# Patient Record
Sex: MALE | Race: WHITE | NOT HISPANIC OR LATINO
[De-identification: names, ages, dates, MRNs, and addresses within clinical notes are randomized per-mention and may not be internally consistent; named-entity substitution may affect disease eponyms.]

---

## 2017-02-01 ENCOUNTER — OTHER (OUTPATIENT)
Age: 52
End: 2017-02-01

## 2017-04-13 ENCOUNTER — APPOINTMENT (OUTPATIENT)
Dept: PULMONOLOGY | Facility: CLINIC | Age: 52
End: 2017-04-13

## 2017-04-13 VITALS
HEIGHT: 69 IN | OXYGEN SATURATION: 98 % | HEART RATE: 69 BPM | DIASTOLIC BLOOD PRESSURE: 80 MMHG | RESPIRATION RATE: 16 BRPM | BODY MASS INDEX: 46.06 KG/M2 | WEIGHT: 311 LBS | SYSTOLIC BLOOD PRESSURE: 130 MMHG | TEMPERATURE: 97.5 F

## 2017-06-21 DIAGNOSIS — I10 ESSENTIAL (PRIMARY) HYPERTENSION: ICD-10-CM

## 2017-06-21 DIAGNOSIS — E78.5 HYPERLIPIDEMIA, UNSPECIFIED: ICD-10-CM

## 2017-07-31 ENCOUNTER — APPOINTMENT (OUTPATIENT)
Dept: SURGERY | Facility: CLINIC | Age: 52
End: 2017-07-31
Payer: COMMERCIAL

## 2017-07-31 VITALS
RESPIRATION RATE: 16 BRPM | OXYGEN SATURATION: 98 % | DIASTOLIC BLOOD PRESSURE: 90 MMHG | HEART RATE: 65 BPM | TEMPERATURE: 98.6 F | HEIGHT: 68 IN | BODY MASS INDEX: 45.54 KG/M2 | WEIGHT: 300.5 LBS | SYSTOLIC BLOOD PRESSURE: 139 MMHG

## 2017-07-31 DIAGNOSIS — K76.0 FATTY (CHANGE OF) LIVER, NOT ELSEWHERE CLASSIFIED: ICD-10-CM

## 2017-07-31 DIAGNOSIS — Z78.9 OTHER SPECIFIED HEALTH STATUS: ICD-10-CM

## 2017-07-31 PROCEDURE — 99245 OFF/OP CONSLTJ NEW/EST HI 55: CPT

## 2017-08-09 ENCOUNTER — APPOINTMENT (OUTPATIENT)
Dept: PULMONOLOGY | Facility: CLINIC | Age: 52
End: 2017-08-09
Payer: COMMERCIAL

## 2017-08-09 VITALS
BODY MASS INDEX: 45.77 KG/M2 | OXYGEN SATURATION: 96 % | TEMPERATURE: 98.5 F | WEIGHT: 302 LBS | HEART RATE: 82 BPM | DIASTOLIC BLOOD PRESSURE: 90 MMHG | SYSTOLIC BLOOD PRESSURE: 130 MMHG | RESPIRATION RATE: 18 BRPM | HEIGHT: 68 IN

## 2017-08-09 PROCEDURE — 94729 DIFFUSING CAPACITY: CPT | Mod: GC

## 2017-08-09 PROCEDURE — 94060 EVALUATION OF WHEEZING: CPT | Mod: GC

## 2017-08-09 PROCEDURE — 94726 PLETHYSMOGRAPHY LUNG VOLUMES: CPT | Mod: GC

## 2017-08-09 PROCEDURE — ZZZZZ: CPT

## 2017-08-09 PROCEDURE — 99215 OFFICE O/P EST HI 40 MIN: CPT | Mod: 25,GC

## 2018-02-06 ENCOUNTER — APPOINTMENT (OUTPATIENT)
Dept: PULMONOLOGY | Facility: CLINIC | Age: 53
End: 2018-02-06
Payer: COMMERCIAL

## 2018-02-06 VITALS
WEIGHT: 300 LBS | SYSTOLIC BLOOD PRESSURE: 130 MMHG | BODY MASS INDEX: 45.47 KG/M2 | RESPIRATION RATE: 18 BRPM | DIASTOLIC BLOOD PRESSURE: 90 MMHG | HEIGHT: 68 IN | HEART RATE: 70 BPM | OXYGEN SATURATION: 99 %

## 2018-02-06 DIAGNOSIS — Z86.79 PERSONAL HISTORY OF OTHER DISEASES OF THE CIRCULATORY SYSTEM: ICD-10-CM

## 2018-02-06 DIAGNOSIS — Z86.39 PERSONAL HISTORY OF OTHER ENDOCRINE, NUTRITIONAL AND METABOLIC DISEASE: ICD-10-CM

## 2018-02-06 DIAGNOSIS — E66.9 OBESITY, UNSPECIFIED: ICD-10-CM

## 2018-02-06 PROCEDURE — 99205 OFFICE O/P NEW HI 60 MIN: CPT | Mod: 25

## 2018-02-06 PROCEDURE — 94060 EVALUATION OF WHEEZING: CPT

## 2018-02-06 PROCEDURE — 94727 GAS DIL/WSHOT DETER LNG VOL: CPT

## 2018-02-06 PROCEDURE — 71046 X-RAY EXAM CHEST 2 VIEWS: CPT

## 2018-02-06 PROCEDURE — 94729 DIFFUSING CAPACITY: CPT

## 2018-08-16 ENCOUNTER — APPOINTMENT (OUTPATIENT)
Dept: PULMONOLOGY | Facility: CLINIC | Age: 53
End: 2018-08-16

## 2018-08-17 ENCOUNTER — APPOINTMENT (OUTPATIENT)
Dept: PULMONOLOGY | Facility: CLINIC | Age: 53
End: 2018-08-17
Payer: COMMERCIAL

## 2018-08-17 VITALS
RESPIRATION RATE: 15 BRPM | OXYGEN SATURATION: 98 % | DIASTOLIC BLOOD PRESSURE: 70 MMHG | HEIGHT: 68 IN | TEMPERATURE: 97.8 F | WEIGHT: 290 LBS | HEART RATE: 69 BPM | BODY MASS INDEX: 43.95 KG/M2 | SYSTOLIC BLOOD PRESSURE: 132 MMHG

## 2018-08-17 PROCEDURE — 99214 OFFICE O/P EST MOD 30 MIN: CPT

## 2020-07-30 ENCOUNTER — APPOINTMENT (OUTPATIENT)
Dept: PULMONOLOGY | Facility: CLINIC | Age: 55
End: 2020-07-30
Payer: COMMERCIAL

## 2020-07-30 PROCEDURE — 99213 OFFICE O/P EST LOW 20 MIN: CPT | Mod: 95

## 2020-07-30 RX ORDER — LISINOPRIL AND HYDROCHLOROTHIAZIDE TABLETS 10; 12.5 MG/1; MG/1
10-12.5 TABLET ORAL
Refills: 0 | Status: ACTIVE | COMMUNITY

## 2020-07-30 RX ORDER — METFORMIN HYDROCHLORIDE 500 MG/1
500 TABLET, EXTENDED RELEASE ORAL
Qty: 180 | Refills: 0 | Status: DISCONTINUED | COMMUNITY
Start: 2018-06-27 | End: 2020-07-30

## 2020-07-30 NOTE — PHYSICAL EXAM
[Normal Appearance] : normal appearance [Well Groomed] : well groomed [General Appearance - In No Acute Distress] : no acute distress [Affect] : the affect was normal [Mood] : the mood was normal

## 2020-07-30 NOTE — HISTORY OF PRESENT ILLNESS
[Other Location: e.g. Home (Enter Location, City,State)___] : at [unfilled] [Home] : at home, [unfilled] , at the time of the visit. [Verbal consent obtained from patient] : the patient, [unfilled] [Unintentional Sleep While Inactive] : unintentional sleep while inactive [AHI: ___ per hour] : Apnea-hypopnea index:  [unfilled] per hour [T90%: ___] : T90%: [unfilled]% [Carl desatuation%: ___] : Carl desaturation:  [unfilled]% [Date: ___] : the most recent therapeutic polysomnogram was completed [unfilled] [Obstructive Sleep Apnea] : obstructive sleep apnea [ESS: ___] : ESS score [unfilled] [Recent  Weight Gain] : recent weight gain [To Bed: ___] : ~he/she~ goes to bed at [unfilled] [Arises: ___] : arises at [unfilled] [Sleep Onset Latency: ___ minutes] : sleep onset latency of [unfilled] minutes reported [Nocturnal Awakenings: ___] : ~he/she~ typically has [unfilled] nocturnal awakenings [WASO: ___] : Wake time after sleep onset is [unfilled] [TST: ___] : Total sleep time is [unfilled] [% Days used: ____] : Days used: [unfilled] % [FreeTextEntry1] : 55 year old TYRONE GORDON  presents for follow-up to 2018 visit for continued management of severe obstructive sleep apnea on CPAP therapy since 1999.\par \par COMORBID:  hypertension, hyperlipidemia, diabetes, morbid obesity, fatty liver.\par \par INTERIM CHANGES:  10-lb weight gain.  Current weight ~300 lbs.  Medication list was updated.\par \par SERENITY  \par The patient was diagnosed with SERENITY in 1999.  Repeat diagnostic PSG in 2013 reported severe SERENITY.  CPAP therapy has been well tolerated since 1999, currently at pressure of 12 cmH2O using a nasal mask without complaints.  With nightly CPAP use for 7-8 hours, patient notes no sore throat, no snoring, improvement in sleep.                                     .   \par \par SLEEP SCHEDULE:  Typically 7-8 hours between 11PM-6AM, with unintentional daytime sleep only while inactive. \par \par EPWORTH SLEEPINESS SCALE\par \par How likely are you to doze off or fall asleep in the situations described below, in contrast to feeling just tired?  This refers to your usual way of life in recent times.  Even if you haven't done some of these things recently, try to work out how they would have affected you.  Use the following scale to choose one most appropriate number for each situation.\par \par 0 = never would doze\par 1 = slight chance of dozing\par 2 = moderate chance of dozing\par 3 = high chance of dozing\par \par Chance of dozing............Situation\par 2........................................Sitting and reading\par 1........................................Watching TV\par 1........................................Sitting inactive in a public place (eg a theatre or a meeting)\par 1........................................As a passenger in a car for an hour without a break\par 1........................................Lying down to rest in the afternoon when circumstances permit\par 0........................................Sitting and talking to someone\par 0........................................Sitting quietly after lunch without alcohol\par 2........................................In a car, while stopped for a few minutes in traffic\par \par 8........................................TOTAL ESS SCORE\par _________________________________________________________________________________\par  [Snoring] : no snoring [Witnessed Apneas] : no witnessed sleep apnea [Frequent Nocturnal Awakening] : no nocturnal awakening [Unintentional Sleep while Active] : no unintentional sleep while active [Daytime Somnolence] : no daytime somnolence [Awakes Unrefreshed] : does not awake unrefreshed [Awakes with Headache] : no headache upon awakening [Awakening With Dry Mouth] : no dry mouth upon awakening [Daytime Sleep: ___] : daytime sleep: [unfilled] [CPAP: ___ cmH2O] : CPAP: [unfilled] cmH2O [Nocturnal Oxygen] : The patient does not use nocturnal oxygen

## 2020-07-30 NOTE — ASSESSMENT
[FreeTextEntry1] : 55 year old TYRONE GORDON with HTN, HLD, DM, morbid obesity, fatty liver, severe obstructive sleep apnea on CPAP therapy since 1999; presents for 2-year follow-up visit.\par \par The patient is benefitting from nightly CPAP therapy for 7-8 hours, with resolution of SERENITY-related symptoms and improvement in sleep quality.  An order was placed for [employer] to continue to renew PAP supplies for continuation of therapy at the current pressure setting, and to link patient's 2018 PAP device.  \par \par Patient gained 10-lbs since last 2013 titration study.  He was advised to avoid weight gain.\par \par He will follow up in one year or sooner if needed.

## 2020-07-30 NOTE — REVIEW OF SYSTEMS
[Recent Wt Gain (___ Lbs)] : recent [unfilled] ~Ulb weight gain [Palpitations] : palpitations [Diabetes] : diabetes  [Obesity] : obesity [Heartburn] : heartburn [Fatigue] : no fatigue [Nasal Congestion] : no nasal congestion [Postnasal Drip] : no postnasal drip [Shortness Of Breath] : no shortness of breath [Chest Pain] : no chest pain [Thyroid Disease] : no thyroid disease [Anemia] : no anemia [History of Iron Deficiency] : no history of iron deficiency [A.M. Headache] : no headache present upon awakening [Nocturia] : no nocturia [Arthralgias] : no arthralgias [Depression] : no depression [Anxious] : not anxious [Difficulty Initiating Sleep] : no difficulty falling asleep [Difficulty Maintaining Sleep] : no difficulty maintaining sleep [Lower Extremity Discomfort] : no lower extremity discomfort [Unusual Sleep Behavior] : no unusual sleep behavior [de-identified] : off metformin [FreeTextEntry1] : 11 PM [FreeTextEntry2] : 6 AM [FreeTextEntry3] : 5-10 minutes [FreeTextEntry4] : 1 [FreeTextEntry5] : brief [FreeTextEntry6] : 7-8 hours [FreeTextEntry7] : only when inactive [FreeTextEntry8] : No

## 2022-09-27 ENCOUNTER — NON-APPOINTMENT (OUTPATIENT)
Age: 57
End: 2022-09-27

## 2022-09-27 ENCOUNTER — APPOINTMENT (OUTPATIENT)
Dept: PULMONOLOGY | Facility: CLINIC | Age: 57
End: 2022-09-27

## 2022-09-27 VITALS
HEIGHT: 68 IN | HEART RATE: 58 BPM | BODY MASS INDEX: 42.44 KG/M2 | DIASTOLIC BLOOD PRESSURE: 64 MMHG | OXYGEN SATURATION: 98 % | TEMPERATURE: 97.5 F | RESPIRATION RATE: 16 BRPM | WEIGHT: 280 LBS | SYSTOLIC BLOOD PRESSURE: 112 MMHG

## 2022-09-27 DIAGNOSIS — R05.8 OTHER SPECIFIED COUGH: ICD-10-CM

## 2022-09-27 DIAGNOSIS — Z87.19 PERSONAL HISTORY OF OTHER DISEASES OF THE DIGESTIVE SYSTEM: ICD-10-CM

## 2022-09-27 DIAGNOSIS — Z87.891 PERSONAL HISTORY OF NICOTINE DEPENDENCE: ICD-10-CM

## 2022-09-27 DIAGNOSIS — Z83.3 FAMILY HISTORY OF DIABETES MELLITUS: ICD-10-CM

## 2022-09-27 DIAGNOSIS — K57.90 DIVERTICULOSIS OF INTESTINE, PART UNSPECIFIED, W/OUT PERFORATION OR ABSCESS W/OUT BLEEDING: ICD-10-CM

## 2022-09-27 DIAGNOSIS — Z82.49 FAMILY HISTORY OF ISCHEMIC HEART DISEASE AND OTHER DISEASES OF THE CIRCULATORY SYSTEM: ICD-10-CM

## 2022-09-27 PROCEDURE — 95012 NITRIC OXIDE EXP GAS DETER: CPT

## 2022-09-27 PROCEDURE — 94618 PULMONARY STRESS TESTING: CPT

## 2022-09-27 PROCEDURE — 99204 OFFICE O/P NEW MOD 45 MIN: CPT | Mod: 25

## 2022-09-27 PROCEDURE — 94010 BREATHING CAPACITY TEST: CPT

## 2022-09-27 PROCEDURE — 71046 X-RAY EXAM CHEST 2 VIEWS: CPT

## 2022-09-27 RX ORDER — BUDESONIDE AND FORMOTEROL FUMARATE DIHYDRATE 160; 4.5 UG/1; UG/1
160-4.5 AEROSOL RESPIRATORY (INHALATION) TWICE DAILY
Qty: 1 | Refills: 1 | Status: ACTIVE | COMMUNITY
Start: 2022-09-27 | End: 1900-01-01

## 2022-09-27 NOTE — HISTORY OF PRESENT ILLNESS
[TextBox_4] : Mr. GORDON is a 57 year old male with a history of prior 12 pack year smoker, ow, HTN, HLD, fatty liver, SERENITY, s/p diverticulosis presenting to the office today for initial pulmonary evaluation. His chief complaint is\par \par -he notes snoring when he doesn't use the CPAP\par -he notes mostly compliant with CPAP\par -he notes neck size is 20\par -he notes ability to fall asleep while watching TV and while in a car \par -he notes his memory and concentration are good \par -he notes dyspnea while in supine position\par -he notes wheezing only while congested\par -he denies PND\par -he notes seasonal allergies in spring\par -he notes losing weight \par -he notes no bowel movement in 3 days\par -he notes energy levels decreases with weight gain\par \par -he denies any headaches, nausea, vomiting, fever, chills, sweats, chest pain, chest pressure, coughing, palpitations, constipation, diarrhea, dizziness, dysphagia, heartburn, reflux, itchy eyes, itchy ears, leg swelling, leg pain, arthralgias, myalgias, or sour taste in the mouth.

## 2022-09-27 NOTE — ADDENDUM
[FreeTextEntry1] : Documented by NANCI Green acting as a scribe for Dr. Yash Hamm on 09/27/2022 .\par All medical record entries made by the Scribe were at my, Dr. Yash Hamm's, direction and personally dictated by me on 09/27/2022. I have reviewed the chart and agree that the record accurately reflects my personal performance of the history, physical exam, assessment and plan. I have also personally directed, reviewed, and agree with the discharge instructions.

## 2022-09-27 NOTE — ASSESSMENT
[FreeTextEntry1] : Mr. GORDON is a 57 year old male with a history of prior 12 pack year smoker, ow, HTN, HLD, fatty liver, SERENITY,  s/p diverticulosis presenting to the office today for initial pulmonary evaluation for SOB, seasonal allergies, GERD, SERENITY\par \par His shortness of breath is multifactorial due to:\par -poor mechanics of breathing \par -out of shape \par -over weight \par -pulmonary disease\par   -\par -cardiac disease \par \par problem 1: ?inflammatory cough\par -add Symbicort 160 2 inhalations BID PRN\par \par problem 2: allergy/sinus\par -complete blood work to include: asthma panel, food IgE panel, IgE level, eosinophil level, vitamin D level \par \par -Environmental measures for allergies were encouraged including mattress and pillow covers, air purifier, and environmental controls. \par \par Problem 3: GERD\par -add Pepcid 40 mg QHS \par \par -Rule of 2s: avoid eating too much, eating too late, eating too spicy, eating two hours before bed.\par -Things to avoid including overeating, spicy foods, tight clothing, eating within three hours of bed, this list is not all inclusive.\par -For treatment of reflux, possible options discussed including diet control, H2 blockers, PPIs, as well as coating motility agents discussed as treatment options. Timing of meals and proximity of last meal to sleep were discussed. If symptoms persist, a formal gastrointestinal evaluation is needed. \par \par Problem 4: SERENITY (risk factors: elevated Mallampati class, snoring, neck size 20)\par -continue using CPAP- tolerating well- Dixon nasal CPAP (11)\par -complete blood work to include: free and total testosterone\par -Sleep apnea is associated with adverse clinical consequences which can affect most organ systems.\par Cardiovascular disease risk includes arrhythmias, atrial fibrillation, hypertension, coronary artery disease, and stroke. Metabolic disorders include diabetes type 2, non-alcoholic fatty liver disease. Mood disorder especially depression; and cognitive decline especially in the elderly. Associations with chronic reflux/Vazquez’s esophagus some but not all inclusive.\par -Reasons include arousal consistent with hypopnea; respiratory events most prominent in REM sleep or supine position; therefore sleep staging and body position are important for accurate diagnosis and estimation of AHI. \par \par problem 5: cardiac disease\par -recommended to continue to follow up with Cardiologist (Sudheer)\par \par problem 6: poor breathing mechanics\par -Recommended Wim Hof and Butana liliako breathing technique \par -Proper breathing techniques were reviewed with an emphasis of exhalation. Patient instructed to breath in for 1 second and out for four seconds. Patient was encouraged to not talk while walking. \par \par problem 7:  overweight/ out of shape\par -recommended "10-Day Detox Diet" by Dr. Zach Gomez \par -Weight loss, exercise, and diet control were discussed and are highly encouraged. Treatment options are given such as, aqua therapy, and contacting a nutritionist. Recommended to use the elliptical, stationary bike, less use of treadmill. \par \par problem 8: health maintenance \par -recommended yearly flu shot after October 15\par -recommended strep pneumonia vaccines: Prevnar-20 vaccine, followed by Pneumo vaccine 23 one year following after 65 years old. \par -recommended early intervention for Upper Respiratory Infections (URIs)\par -recommended regular osteoporosis evaluations\par -recommended early dermatological evaluations\par -recommended after the age of 50 to the age of 70, colonoscopy every 5 years\par \par F/U in 6-8 weeks.\par He is encouraged to call with any changes, concerns, or questions

## 2022-09-27 NOTE — PROCEDURE
[FreeTextEntry1] : CXR reveals a normal sized heart; no evidence of infiltrate or effusion--a normal appearing chest radiograph \par \par PFT reveals normal flows, with an FEV1 of  3.19L, which is 91% of predicted, with a normal flow volume loop. \par \par 6 minute walk test reveals a low saturation of 97% with no evidence of dyspnea or fatigue; walked 586.8 meters\par \par FENO was 21; a normal value being less than 25\par Fractional exhaled nitric oxide (FENO) is regarded as a simple, noninvasive method for assessing eosinophilic airway inflammation. Produced by a variety of cells within the lung, nitric oxide (NO) concentrations are generally low in healthy individuals. However, high concentrations of NO appear to be involved in nonspecific host defense mechanisms and chronic inflammatory diseases such as asthma. The American Thoracic Society (ATS) therefore has recommended using FENO to aid in the diagnosis and monitoring of eosinophilic airway inflammation and asthma, and for identifying steroid responsive individuals whose chronic respiratory symptoms may be caused by airway inflammation.

## 2022-10-27 ENCOUNTER — LABORATORY RESULT (OUTPATIENT)
Age: 57
End: 2022-10-27

## 2022-10-28 LAB
24R-OH-CALCIDIOL SERPL-MCNC: 34.2 PG/ML
25(OH)D3 SERPL-MCNC: 29.1 NG/ML
BASOPHILS # BLD AUTO: 0.04 K/UL
BASOPHILS NFR BLD AUTO: 0.5 %
EOSINOPHIL # BLD AUTO: 0.21 K/UL
EOSINOPHIL NFR BLD AUTO: 2.6 %
HCT VFR BLD CALC: 45.8 %
HGB BLD-MCNC: 15.3 G/DL
IMM GRANULOCYTES NFR BLD AUTO: 0.6 %
LYMPHOCYTES # BLD AUTO: 2.5 K/UL
LYMPHOCYTES NFR BLD AUTO: 30.7 %
MAN DIFF?: NORMAL
MCHC RBC-ENTMCNC: 28.8 PG
MCHC RBC-ENTMCNC: 33.4 GM/DL
MCV RBC AUTO: 86.1 FL
MONOCYTES # BLD AUTO: 0.47 K/UL
MONOCYTES NFR BLD AUTO: 5.8 %
NEUTROPHILS # BLD AUTO: 4.87 K/UL
NEUTROPHILS NFR BLD AUTO: 59.8 %
PLATELET # BLD AUTO: 213 K/UL
RBC # BLD: 5.32 M/UL
RBC # FLD: 13.6 %
WBC # FLD AUTO: 8.14 K/UL

## 2022-10-30 LAB
A ALTERNATA IGE QN: <0.1 KUA/L
A FUMIGATUS IGE QN: <0.1 KUA/L
C ALBICANS IGE QN: <0.1 KUA/L
C HERBARUM IGE QN: <0.1 KUA/L
CAT DANDER IGE QN: <0.1 KUA/L
CLAM IGE QN: <0.1 KUA/L
CODFISH IGE QN: <0.1 KUA/L
COMMON RAGWEED IGE QN: 0.36 KUA/L
CORN IGE QN: 0.19 KUA/L
COW MILK IGE QN: 0.12 KUA/L
D FARINAE IGE QN: <0.1 KUA/L
D PTERONYSS IGE QN: <0.1 KUA/L
DEPRECATED A ALTERNATA IGE RAST QL: 0
DEPRECATED A FUMIGATUS IGE RAST QL: 0
DEPRECATED C ALBICANS IGE RAST QL: 0
DEPRECATED C HERBARUM IGE RAST QL: 0
DEPRECATED CAT DANDER IGE RAST QL: 0
DEPRECATED CLAM IGE RAST QL: 0
DEPRECATED CODFISH IGE RAST QL: 0
DEPRECATED COMMON RAGWEED IGE RAST QL: 1
DEPRECATED CORN IGE RAST QL: NORMAL
DEPRECATED COW MILK IGE RAST QL: NORMAL
DEPRECATED D FARINAE IGE RAST QL: 0
DEPRECATED D PTERONYSS IGE RAST QL: 0
DEPRECATED DOG DANDER IGE RAST QL: 0
DEPRECATED DUCK FEATHER IGE RAST QL: 0
DEPRECATED EGG WHITE IGE RAST QL: NORMAL
DEPRECATED GOOSE FEATHER IGE RAST QL: 0
DEPRECATED M RACEMOSUS IGE RAST QL: NORMAL
DEPRECATED PEANUT IGE RAST QL: 1
DEPRECATED ROACH IGE RAST QL: NORMAL
DEPRECATED SCALLOP IGE RAST QL: <0.1 KUA/L
DEPRECATED SESAME SEED IGE RAST QL: NORMAL
DEPRECATED SHRIMP IGE RAST QL: 0
DEPRECATED SOYBEAN IGE RAST QL: NORMAL
DEPRECATED TIMOTHY IGE RAST QL: 1
DEPRECATED WALNUT IGE RAST QL: NORMAL
DEPRECATED WHEAT IGE RAST QL: NORMAL
DEPRECATED WHITE OAK IGE RAST QL: 1
DOG DANDER IGE QN: <0.1 KUA/L
DUCK FEATHER IGE QN: <0.1 KUA/L
EGG WHITE IGE QN: 0.12 KUA/L
GOOSE FEATHER IGE QN: <0.1 KUA/L
M RACEMOSUS IGE QN: 0.13 KUA/L
PEANUT IGE QN: 0.36 KUA/L
ROACH IGE QN: 0.17 KUA/L
SCALLOP IGE QN: 0
SCALLOP IGE QN: <0.1 KUA/L
SESAME SEED IGE QN: 0.33 KUA/L
SOYBEAN IGE QN: 0.2 KUA/L
TIMOTHY IGE QN: 0.38 KUA/L
TOTAL IGE SMQN RAST: 29 KU/L
WALNUT IGE QN: 0.19 KUA/L
WHEAT IGE QN: 0.27 KUA/L
WHITE OAK IGE QN: 0.4 KUA/L

## 2022-10-31 LAB
A ALTERNATA IGE QN: <0.1 KUA/L
A FUMIGATUS IGE QN: <0.1 KUA/L
BERMUDA GRASS IGE QN: 0.31 KUA/L
BOXELDER IGE QN: 0.34 KUA/L
C HERBARUM IGE QN: <0.1 KUA/L
CALIF WALNUT IGE QN: 0.46 KUA/L
CAT DANDER IGE QN: <0.1 KUA/L
CMN PIGWEED IGE QN: 0.34 KUA/L
COMMON RAGWEED IGE QN: 0.36 KUA/L
COTTONWOOD IGE QN: 0.26 KUA/L
D FARINAE IGE QN: <0.1 KUA/L
D PTERONYSS IGE QN: <0.1 KUA/L
DEPRECATED A ALTERNATA IGE RAST QL: 0
DEPRECATED A FUMIGATUS IGE RAST QL: 0
DEPRECATED BERMUDA GRASS IGE RAST QL: NORMAL
DEPRECATED BOXELDER IGE RAST QL: NORMAL
DEPRECATED C HERBARUM IGE RAST QL: 0
DEPRECATED CAT DANDER IGE RAST QL: 0
DEPRECATED COMMON PIGWEED IGE RAST QL: NORMAL
DEPRECATED COMMON RAGWEED IGE RAST QL: 1
DEPRECATED COTTONWOOD IGE RAST QL: NORMAL
DEPRECATED D FARINAE IGE RAST QL: 0
DEPRECATED D PTERONYSS IGE RAST QL: 0
DEPRECATED DOG DANDER IGE RAST QL: 0
DEPRECATED GOOSEFOOT IGE RAST QL: 1
DEPRECATED LONDON PLANE IGE RAST QL: 1
DEPRECATED MOUSE URINE PROT IGE RAST QL: 0
DEPRECATED MUGWORT IGE RAST QL: NORMAL
DEPRECATED P NOTATUM IGE RAST QL: 0
DEPRECATED RED CEDAR IGE RAST QL: NORMAL
DEPRECATED ROACH IGE RAST QL: NORMAL
DEPRECATED SHEEP SORREL IGE RAST QL: 1
DEPRECATED SILVER BIRCH IGE RAST QL: NORMAL
DEPRECATED TIMOTHY IGE RAST QL: 1
DEPRECATED WHITE ASH IGE RAST QL: 1
DEPRECATED WHITE OAK IGE RAST QL: 1
DOG DANDER IGE QN: <0.1 KUA/L
GOOSEFOOT IGE QN: 0.41 KUA/L
LONDON PLANE IGE QN: 0.37 KUA/L
MOUSE URINE PROT IGE QN: <0.1 KUA/L
MUGWORT IGE QN: 0.21 KUA/L
MULBERRY (T70) CLASS: NORMAL
MULBERRY (T70) CONC: 0.18 KUA/L
P NOTATUM IGE QN: <0.1 KUA/L
RED CEDAR IGE QN: 0.29 KUA/L
ROACH IGE QN: 0.17 KUA/L
SHEEP SORREL IGE QN: 0.41 KUA/L
SILVER BIRCH IGE QN: 0.21 KUA/L
TIMOTHY IGE QN: 0.38 KUA/L
TREE ALLERG MIX1 IGE QL: 1
WHITE ASH IGE QN: 0.37 KUA/L
WHITE ELM IGE QN: 0.41 KUA/L
WHITE ELM IGE QN: 1
WHITE OAK IGE QN: 0.4 KUA/L

## 2022-11-01 LAB
TESTOST FREE SERPL-MCNC: 6.3 PG/ML
TESTOST SERPL-MCNC: 273 NG/DL

## 2022-12-02 ENCOUNTER — NON-APPOINTMENT (OUTPATIENT)
Age: 57
End: 2022-12-02

## 2023-01-09 ENCOUNTER — APPOINTMENT (OUTPATIENT)
Dept: PULMONOLOGY | Facility: CLINIC | Age: 58
End: 2023-01-09
Payer: COMMERCIAL

## 2023-01-09 VITALS
TEMPERATURE: 97.7 F | BODY MASS INDEX: 43.95 KG/M2 | SYSTOLIC BLOOD PRESSURE: 110 MMHG | DIASTOLIC BLOOD PRESSURE: 70 MMHG | HEIGHT: 68 IN | RESPIRATION RATE: 16 BRPM | HEART RATE: 78 BPM | WEIGHT: 290 LBS | OXYGEN SATURATION: 98 %

## 2023-01-09 PROCEDURE — 94729 DIFFUSING CAPACITY: CPT

## 2023-01-09 PROCEDURE — 95012 NITRIC OXIDE EXP GAS DETER: CPT

## 2023-01-09 PROCEDURE — 94727 GAS DIL/WSHOT DETER LNG VOL: CPT

## 2023-01-09 PROCEDURE — 99214 OFFICE O/P EST MOD 30 MIN: CPT | Mod: 25

## 2023-01-09 PROCEDURE — 94010 BREATHING CAPACITY TEST: CPT

## 2023-01-09 PROCEDURE — ZZZZZ: CPT

## 2023-01-09 NOTE — PROCEDURE
[FreeTextEntry1] : Full PFT reveals normal flows; FEV1 was 3.11L which is 93% of predicted; normal lung volumes; normal diffusion at 21.3, which is 71% of predicted; normal flow volume loop. \par \par FENO was 16; a normal value being less than 25\par Fractional exhaled nitric oxide (FENO) is regarded as a simple, noninvasive method for assessing eosinophilic airway inflammation. Produced by a variety of cells within the lung, nitric oxide (NO) concentrations are generally low in healthy individuals. However, high concentrations of NO appear to be involved in nonspecific host defense mechanisms and chronic inflammatory diseases such as asthma. The American Thoracic Society (ATS) therefore has recommended using FENO to aid in the diagnosis and monitoring of eosinophilic airway inflammation and asthma, and for identifying steroid responsive individuals whose chronic respiratory symptoms may be caused by airway inflammation.

## 2023-01-09 NOTE — ASSESSMENT
[FreeTextEntry1] : Mr. GORDON is a 57 year old male with a history of prior 12 pack year smoker, ow, HTN, HLD, fatty liver, SERENITY,  s/p diverticulosis presenting to the office today for f/p pulmonary evaluation for SOB, seasonal allergies, GERD, SERENITY- stable\par \par His shortness of breath is multifactorial due to:\par -poor mechanics of breathing \par -out of shape \par -over weight \par -pulmonary disease\par   -asthma\par -cardiac disease \par \par problem 1: ?inflammatory cough- mild asthma\par -add Symbicort 160 2 inhalations BID PRN\par -add Ventolin 2 puffs Q6H, pre-exercise \par -Asthma is believed to be caused by inherited (genetic) and environmental factor, but its exact cause is unknown. Asthma may be triggered by allergens, lung infections, or irritants in the air. Asthma triggers are different for each person. \par -Inhaler technique reviewed as well as oral hygiene techniques reviewed with patient. Avoidance of cold air, extremes of temperature; rescue inhaler should be used before exercise. Order of medication reviewed with patient. Recommended use of a cool mist humidifier in the bedroom. \par \par problem 2: allergy/sinus\par -s/p blood work to include: asthma panel (+), food IgE panel(+), IgE level (-), eosinophil level (-), vitamin D level (low)\par \par -Environmental measures for allergies were encouraged including mattress and pillow covers, air purifier, and environmental controls. \par \par Problem 2A: low Vit D\par -on Rx\par -Low vitamin D levels have been associated with asthma exacerbations and increased allergic symptoms. The goal, based on recent information, is maintaining levels between 50-70 and low normal is 30. Recommended 50,000 units every two weeks to once a month depending on the level. \par \par Problem 3: GERD\par -add Pepcid 40 mg QHS \par \par -Rule of 2s: avoid eating too much, eating too late, eating too spicy, eating two hours before bed.\par -Things to avoid including overeating, spicy foods, tight clothing, eating within three hours of bed, this list is not all inclusive.\par -For treatment of reflux, possible options discussed including diet control, H2 blockers, PPIs, as well as coating motility agents discussed as treatment options. Timing of meals and proximity of last meal to sleep were discussed. If symptoms persist, a formal gastrointestinal evaluation is needed. \par \par Problem 4: SERENITY (risk factors: elevated Mallampati class, snoring, neck size 20)\par -continue using CPAP- tolerating well- Dixon nasal CPAP (11)\par -complete blood work to include: free and total testosterone\par -Sleep apnea is associated with adverse clinical consequences which can affect most organ systems.\par Cardiovascular disease risk includes arrhythmias, atrial fibrillation, hypertension, coronary artery disease, and stroke. Metabolic disorders include diabetes type 2, non-alcoholic fatty liver disease. Mood disorder especially depression; and cognitive decline especially in the elderly. Associations with chronic reflux/Vazquez’s esophagus some but not all inclusive.\par -Reasons include arousal consistent with hypopnea; respiratory events most prominent in REM sleep or supine position; therefore sleep staging and body position are important for accurate diagnosis and estimation of AHI. \par \par problem 5: cardiac disease\par -recommended to continue to follow up with Cardiologist (Sudheer)\par \par problem 6: poor breathing mechanics\par -Recommended Wim Hof and [place] breathing technique \par -Proper breathing techniques were reviewed with an emphasis of exhalation. Patient instructed to breath in for 1 second and out for four seconds. Patient was encouraged to not talk while walking. \par \par problem 7:  overweight/ out of shape\par -recommended "10-Day Detox Diet" by Dr. Zach Gomez \par -Weight loss, exercise, and diet control were discussed and are highly encouraged. Treatment options are given such as, aqua therapy, and contacting a nutritionist. Recommended to use the elliptical, stationary bike, less use of treadmill. \par \par problem 8: health maintenance \par -recommended yearly flu shot after October 15 s/p 2022\par -recommended strep pneumonia vaccines: Prevnar-20 vaccine, followed by Pneumo vaccine 23 one year following after 65 years old. \par -recommended early intervention for Upper Respiratory Infections (URIs)\par -recommended regular osteoporosis evaluations\par -recommended early dermatological evaluations\par -recommended after the age of 50 to the age of 70, colonoscopy every 5 years\par \par F/U in 6-8 weeks.\par He is encouraged to call with any changes, concerns, or questions

## 2023-01-09 NOTE — HISTORY OF PRESENT ILLNESS
[TextBox_4] : Mr. GORDON is a 57 year old male with a history of prior 12 pack year smoker, ow, HTN, HLD, fatty liver, SERENITY, s/p diverticulosis presenting to the office today for f/p pulmonary evaluation. His chief complaint is\par \par -he notes feeling generally well \par -he notes good quality of sleep\par -he notes sleeping for  6-8hrs\par -he notes intermittent constipation\par -he notes bowels are regular otherwise\par -he denies exercising \par -he notes knee issue\par -he notes LEON on stairs\par -he notes intermittent reflux\par -he notes vision is stable \par -he notes sinuses are quiet \par -he notes weight is stable \par -he notes diet is stable\par -he notes compliant with and tolerating CPAP well\par \par -he denies any headaches, nausea, emesis, fever, chills, sweats, chest pain, chest pressure, coughing, wheezing, palpitations, diarrhea, vertigo, dysphagia, heartburn, reflux, itchy eyes, itchy ears, leg swelling, leg pain, arthralgias, myalgias, or sour taste in the mouth.

## 2023-01-09 NOTE — ADDENDUM
[FreeTextEntry1] : Documented by NANCI Green acting as a scribe for Dr. Yash Hamm on 01/09/2023 .\par \par All medical record entries made by the Scribe were at my, Dr. Yash Hamm's, direction and personally dictated by me on 01/09/2023. I have reviewed the chart and agree that the record accurately reflects my personal performance of the history, physical exam, assessment and plan. I have also personally directed, reviewed, and agree with the discharge instructions.

## 2023-05-23 ENCOUNTER — APPOINTMENT (OUTPATIENT)
Dept: PULMONOLOGY | Facility: CLINIC | Age: 58
End: 2023-05-23
Payer: COMMERCIAL

## 2023-05-23 VITALS
OXYGEN SATURATION: 99 % | DIASTOLIC BLOOD PRESSURE: 80 MMHG | SYSTOLIC BLOOD PRESSURE: 130 MMHG | HEART RATE: 68 BPM | TEMPERATURE: 97 F | HEIGHT: 68 IN | RESPIRATION RATE: 16 BRPM | WEIGHT: 290 LBS | BODY MASS INDEX: 43.95 KG/M2

## 2023-05-23 PROCEDURE — 99214 OFFICE O/P EST MOD 30 MIN: CPT | Mod: 25

## 2023-05-23 PROCEDURE — 94010 BREATHING CAPACITY TEST: CPT

## 2023-05-23 PROCEDURE — 95012 NITRIC OXIDE EXP GAS DETER: CPT

## 2023-05-23 PROCEDURE — 94729 DIFFUSING CAPACITY: CPT

## 2023-05-23 PROCEDURE — 94727 GAS DIL/WSHOT DETER LNG VOL: CPT

## 2023-05-23 NOTE — PROCEDURE
[FreeTextEntry1] : Full PFT reveals normal flows; FEV1 was  3.29L which is 98% of predicted; normal lung volumes; normal diffusion at 21.2, which is 70% of predicted; normal flow volume loop.\par PFTs were performed to evaluate for SOB, asthma\par \par FENO was 20; a normal value being less than 25\par Fractional exhaled nitric oxide (FENO) is regarded as a simple, noninvasive method for assessing eosinophilic airway inflammation. Produced by a variety of cells within the lung, nitric oxide (NO) concentrations are generally low in healthy individuals. However, high concentrations of NO appear to be involved in nonspecific host defense mechanisms and chronic inflammatory diseases such as asthma. The American Thoracic Society (ATS) therefore has recommended using FENO to aid in the diagnosis and monitoring of eosinophilic airway inflammation and asthma, and for identifying steroid responsive individuals whose chronic respiratory symptoms may be caused by airway inflammation.

## 2023-05-23 NOTE — ADDENDUM
[FreeTextEntry1] : Documented by NANCI Green acting as a scribe for Dr. Yash Hamm on 05/23/2023 .\par \par All medical record entries made by the Scribe were at my, Dr. Yash Hamm's, direction and personally dictated by me on 05/23/2023. I have reviewed the chart and agree that the record accurately reflects my personal performance of the history, physical exam, assessment and plan. I have also personally directed, reviewed, and agree with the discharge instructions.\par

## 2023-05-23 NOTE — ASSESSMENT
[FreeTextEntry1] : Mr. GORDON is a 57 year old male with a history of prior 12 pack year smoker, ow, HTN, HLD, fatty liver, SERENITY,  s/p diverticulosis presenting to the office today for f/p pulmonary evaluation for SOB, seasonal allergies, GERD, SERENITY- stable- on CPAP\par \par His shortness of breath is multifactorial due to:\par -poor mechanics of breathing \par -out of shape \par -over weight \par -pulmonary disease\par   -asthma\par -cardiac disease \par \par problem 1: ?inflammatory cough- mild asthma (quiet)\par -add Symbicort 160 2 inhalations BID PRN\par -add Ventolin 2 puffs Q6H, pre-exercise \par -Asthma is believed to be caused by inherited (genetic) and environmental factor, but its exact cause is unknown. Asthma may be triggered by allergens, lung infections, or irritants in the air. Asthma triggers are different for each person. \par -Inhaler technique reviewed as well as oral hygiene techniques reviewed with patient. Avoidance of cold air, extremes of temperature; rescue inhaler should be used before exercise. Order of medication reviewed with patient. Recommended use of a cool mist humidifier in the bedroom. \par \par problem 2: allergy/sinus (quiet)\par -s/p blood work to include: asthma panel (+), food IgE panel(+), IgE level (-), eosinophil level (-), vitamin D level (low)\par \par -Environmental measures for allergies were encouraged including mattress and pillow covers, air purifier, and environmental controls. \par \par Problem 2A: low Vit D\par -on Rx\par -Low vitamin D levels have been associated with asthma exacerbations and increased allergic symptoms. The goal, based on recent information, is maintaining levels between 50-70 and low normal is 30. Recommended 50,000 units every two weeks to once a month depending on the level. \par \par Problem 3: GERD\par -add Pepcid 40 mg QHS \par \par -Rule of 2s: avoid eating too much, eating too late, eating too spicy, eating two hours before bed.\par -Things to avoid including overeating, spicy foods, tight clothing, eating within three hours of bed, this list is not all inclusive.\par -For treatment of reflux, possible options discussed including diet control, H2 blockers, PPIs, as well as coating motility agents discussed as treatment options. Timing of meals and proximity of last meal to sleep were discussed. If symptoms persist, a formal gastrointestinal evaluation is needed. \par \par Problem 4: SERENITY (risk factors: elevated Mallampati class, snoring, neck size 20)\par -continue using CPAP- tolerating well- [place] nasal CPAP (11)\par -s/p blood work to include: free and total testosterone (WNL)\par -Sleep apnea is associated with adverse clinical consequences which can affect most organ systems.\par Cardiovascular disease risk includes arrhythmias, atrial fibrillation, hypertension, coronary artery disease, and stroke. Metabolic disorders include diabetes type 2, non-alcoholic fatty liver disease. Mood disorder especially depression; and cognitive decline especially in the elderly. Associations with chronic reflux/Vazquez’s esophagus some but not all inclusive.\par -Reasons include arousal consistent with hypopnea; respiratory events most prominent in REM sleep or supine position; therefore sleep staging and body position are important for accurate diagnosis and estimation of AHI. \par \par problem 5: cardiac disease\par -recommended to continue to follow up with Cardiologist (Sudheer)\par \par problem 6: poor breathing mechanics\par -Recommended Wim Hof and Buteyko breathing technique \par -Proper breathing techniques were reviewed with an emphasis of exhalation. Patient instructed to breath in for 1 second and out for four seconds. Patient was encouraged to not talk while walking. \par \par problem 7:  overweight/ out of shape\par -recommended "10-Day Detox Diet" by Dr. Zach Gomez \par -recommended consultation with Dr. Lorraine Mejia and Dr. Cain Reyes \par -Weight loss, exercise, and diet control were discussed and are highly encouraged. Treatment options are given such as, aqua therapy, and contacting a nutritionist. Recommended to use the elliptical, stationary bike, less use of treadmill. \par \par problem 8: health maintenance \par -recommended yearly flu shot after October 15 s/p 2022\par -recommended strep pneumonia vaccines: Prevnar-20 vaccine, followed by Pneumo vaccine 23 one year following after 65 years old. \par -recommended early intervention for Upper Respiratory Infections (URIs)\par -recommended regular osteoporosis evaluations\par -recommended early dermatological evaluations\par -recommended after the age of 50 to the age of 70, colonoscopy every 5 years\par \par F/U in 3-4 months\par He is encouraged to call with any changes, concerns, or questions

## 2023-05-23 NOTE — HISTORY OF PRESENT ILLNESS
[TextBox_4] : Mr. GORDON is a 57 year old male with a history of prior 12 pack year smoker, ow, HTN, HLD, fatty liver, SERENITY, s/p diverticulosis presenting to the office today for f/p pulmonary evaluation. His chief complaint is\par \par -he notes feeling generally well\par -he denies exercising \par -he notes intermittent L chest pain \par -he notes GERD Sx\par -he notes intermittent constipation\par -he notes his senses of smell and taste are stable\par -he notes mild ankle swelling\par -he notes energy levels are 6/10\par -he notes SOB in supine position\par -he notes weight is stable\par -he notes active allergies\par \par -he denies any headaches, nausea, emesis, fever, chills, sweats, chest pressure, coughing, wheezing, palpitations, diarrhea, vertigo, dysphagia, itchy eyes, itchy ears, arthralgias, myalgias, or sour taste in the mouth.\par

## 2023-11-21 ENCOUNTER — APPOINTMENT (OUTPATIENT)
Dept: PULMONOLOGY | Facility: CLINIC | Age: 58
End: 2023-11-21
Payer: COMMERCIAL

## 2023-11-21 VITALS
TEMPERATURE: 97.3 F | SYSTOLIC BLOOD PRESSURE: 116 MMHG | BODY MASS INDEX: 40.62 KG/M2 | HEART RATE: 59 BPM | WEIGHT: 268 LBS | OXYGEN SATURATION: 98 % | HEIGHT: 68 IN | RESPIRATION RATE: 16 BRPM | DIASTOLIC BLOOD PRESSURE: 80 MMHG

## 2023-11-21 DIAGNOSIS — J98.11 ATELECTASIS: ICD-10-CM

## 2023-11-21 PROCEDURE — 99214 OFFICE O/P EST MOD 30 MIN: CPT | Mod: 25

## 2023-11-21 PROCEDURE — 94010 BREATHING CAPACITY TEST: CPT

## 2023-11-21 PROCEDURE — 95012 NITRIC OXIDE EXP GAS DETER: CPT

## 2023-12-20 ENCOUNTER — APPOINTMENT (OUTPATIENT)
Dept: BARIATRICS/WEIGHT MGMT | Facility: CLINIC | Age: 58
End: 2023-12-20

## 2024-05-21 ENCOUNTER — APPOINTMENT (OUTPATIENT)
Dept: PULMONOLOGY | Facility: CLINIC | Age: 59
End: 2024-05-21
Payer: COMMERCIAL

## 2024-05-21 VITALS
HEART RATE: 77 BPM | OXYGEN SATURATION: 96 % | SYSTOLIC BLOOD PRESSURE: 124 MMHG | BODY MASS INDEX: 41.68 KG/M2 | WEIGHT: 275 LBS | DIASTOLIC BLOOD PRESSURE: 80 MMHG | HEIGHT: 68 IN | TEMPERATURE: 97.4 F | RESPIRATION RATE: 18 BRPM

## 2024-05-21 DIAGNOSIS — R06.02 SHORTNESS OF BREATH: ICD-10-CM

## 2024-05-21 DIAGNOSIS — J30.9 ALLERGIC RHINITIS, UNSPECIFIED: ICD-10-CM

## 2024-05-21 DIAGNOSIS — J30.2 OTHER SEASONAL ALLERGIC RHINITIS: ICD-10-CM

## 2024-05-21 DIAGNOSIS — E66.01 MORBID (SEVERE) OBESITY DUE TO EXCESS CALORIES: ICD-10-CM

## 2024-05-21 DIAGNOSIS — G47.33 OBSTRUCTIVE SLEEP APNEA (ADULT) (PEDIATRIC): ICD-10-CM

## 2024-05-21 DIAGNOSIS — K21.9 GASTRO-ESOPHAGEAL REFLUX DISEASE W/OUT ESOPHAGITIS: ICD-10-CM

## 2024-05-21 DIAGNOSIS — J45.30 MILD PERSISTENT ASTHMA, UNCOMPLICATED: ICD-10-CM

## 2024-05-21 PROCEDURE — 99214 OFFICE O/P EST MOD 30 MIN: CPT | Mod: 25

## 2024-05-21 PROCEDURE — 95012 NITRIC OXIDE EXP GAS DETER: CPT

## 2024-05-21 PROCEDURE — 94010 BREATHING CAPACITY TEST: CPT

## 2024-05-21 RX ORDER — METFORMIN HYDROCHLORIDE 625 MG/1
TABLET ORAL
Refills: 0 | Status: ACTIVE | COMMUNITY

## 2024-05-21 NOTE — ADDENDUM
[FreeTextEntry1] : Documented by Lorena Molina acting as a scribe for Dr. Yash Hamm on 05/21/2024. All medical record entries made by the Scribe were at my, Dr. Yash Hamm's, direction and personally dictated by me on 05/21/2024. I have reviewed the chart and agree that the record accurately reflects my personal performance of the history, physical exam, assessment and plan. I have also personally directed, reviewed, and agree with the discharge instructions.

## 2024-05-21 NOTE — PHYSICAL EXAM
[No Acute Distress] : no acute distress [Normal Oropharynx] : normal oropharynx [III] : Mallampati Class: III [Normal Appearance] : normal appearance [No Neck Mass] : no neck mass [Normal Rate/Rhythm] : normal rate/rhythm [Normal S1, S2] : normal s1, s2 [No Murmurs] : no murmurs [No Resp Distress] : no resp distress [Clear to Auscultation Bilaterally] : clear to auscultation bilaterally [No Abnormalities] : no abnormalities [Benign] : benign [Normal Gait] : normal gait [No Clubbing] : no clubbing [No Cyanosis] : no cyanosis [FROM] : FROM [Normal Color/ Pigmentation] : normal color/ pigmentation [No Focal Deficits] : no focal deficits [Oriented x3] : oriented x3 [Normal Affect] : normal affect [TextBox_2] : ow [TextBox_68] : I:E ratio 1:3; clear  [TextBox_105] : trace edema

## 2024-05-21 NOTE — ASSESSMENT
[FreeTextEntry1] : Mr. GORDON is a 58 year old male with a history of prior 12 pack year smoker, OW, HTN, HLD, fatty liver, SERENITY, s/p diverticulosis- SOB, seasonal allergies, GERD, SERENITY- stable- on CPAP (improved with weight loss), mild allergies 4/2024 (stable)  His shortness of breath is multifactorial due to: -poor mechanics of breathing -out of shape -overweight -pulmonary disease  -asthma -?cardiac disease  problem 1: ?inflammatory cough- mild asthma (quiet) -continue Symbicort 160 2 inhalations BID PRN -continue Ventolin 2 puffs Q6H, pre-exercise -Asthma is believed to be caused by inherited (genetic) and environmental factor, but its exact cause is unknown. Asthma may be triggered by allergens, lung infections, or irritants in the air. Asthma triggers are different for each person. -Inhaler technique reviewed as well as oral hygiene techniques reviewed with patient. Avoidance of cold air, extremes of temperature; rescue inhaler should be used before exercise. Order of medication reviewed with patient. Recommended use of a cool mist humidifier in the bedroom.  problem 2: allergy/sinus (quiet) -s/p blood work to include: asthma panel (+), food IgE panel(+), IgE level (-), eosinophil level (-), vitamin D level (low) -Environmental measures for allergies were encouraged including mattress and pillow covers, air purifier, and environmental controls.  Problem 2A: low Vit D -on Rx -Low vitamin D levels have been associated with asthma exacerbations and increased allergic symptoms. The goal, based on recent information, is maintaining levels between 50-70 and low normal is 30. Recommended 50,000 units every two weeks to once a month depending on the level.  Problem 3: GERD -continue Pepcid 40 mg QHS -Rule of 2s: avoid eating too much, eating too late, eating too spicy, eating two hours before bed. -Things to avoid including overeating, spicy foods, tight clothing, eating within three hours of bed, this list is not all inclusive. -For treatment of reflux, possible options discussed including diet control, H2 blockers, PPIs, as well as coating motility agents discussed as treatment options. Timing of meals and proximity of last meal to sleep were discussed. If symptoms persist, a formal gastrointestinal evaluation is needed.  Problem 4: SERENITY (risk factors: elevated Mallampati class, snoring, neck size 20) -continue using CPAP- tolerating well- [place] nasal CPAP (11) -s/p blood work to include: free and total testosterone (WNL) -repeat home sleep study -Sleep apnea is associated with adverse clinical consequences which can affect most organ systems. Cardiovascular disease risk includes arrhythmias, atrial fibrillation, hypertension, coronary artery disease, and stroke. Metabolic disorders include diabetes type 2, non-alcoholic fatty liver disease. Mood disorder especially depression; and cognitive decline especially in the elderly. Associations with chronic reflux/Vazquez's esophagus some but not all inclusive. -Reasons include arousal consistent with hypopnea; respiratory events most prominent in REM sleep or supine position; therefore sleep staging and body position are important for accurate diagnosis and estimation of AHI.  problem 5: cardiac disease -recommended to continue to follow up with Cardiologist (Sudheer)  problem 6: poor breathing mechanics -Recommended Win[place] Hof and Buteyko breathing technique -Proper breathing techniques were reviewed with an emphasis of exhalation. Patient instructed to breath in for 1 second and out for four seconds. Patient was encouraged to not talk while walking.  problem 7: overweight/ out of shape -recommended "10-Day Detox Diet" by Dr. Zach Gomez -recommended consultation with Dr. Cain Reyes -recommended Berberine Synergy OTC supplement for visceral fat loss  -Weight loss, exercise, and diet control were discussed and are highly encouraged. Treatment options are given such as, aqua therapy, and contacting a nutritionist. Recommended to use the elliptical, stationary bike, less use of treadmill.  problem 8: health maintenance -recommended yearly flu shot after October 15 s/p 2023 -recommended strep pneumonia vaccines: Prevnar-20 vaccine, followed by Pneumo vaccine 23 one year following after 65 years old. -recommended early intervention for Upper Respiratory Infections (URIs) -recommended regular osteoporosis evaluations -recommended early dermatological evaluations -recommended after the age of 50 to the age of 70, colonoscopy every 5 years  F/P in 3-4 months He is encouraged to call with any changes, concerns, or questions.

## 2024-05-21 NOTE — HISTORY OF PRESENT ILLNESS
[TextBox_4] : Mr. GORDON is a 58 year old male with a history of prior 12 pack year smoker, OW, HTN, HLD, fatty liver, SERENITY, s/p diverticulosis presenting to the office today for a follow-up pulmonary evaluation. His chief complaint is  -he notes feeling generally well  -he's thinking of going to the gym with his 15-year-old son -he notes energy levels are good  -he notes getting enough sleep  -he notes sleeping for 5.5 hours, though he sleeps 1.5 hours on the couch beforehand, when he's waiting to  [place] the train station -he notes weight is stable  -he notes coughing if he eats peanuts due to his allergy -he notes back pain if he overexerts himself while working around the house -he notes he sneezes in 4/2024 when the windows are open -he notes he's on metformin  -he denies any headaches, nausea, emesis, fever, chills, sweats, chest pain, chest pressure, coughing, wheezing, palpitations, diarrhea, constipation, dysphagia, vertigo, leg swelling, itchy eyes, itchy ears, heartburn, reflux, or sour taste in the mouth.

## 2024-05-21 NOTE — PROCEDURE
[FreeTextEntry1] : PFTs revealed normal flows; FEV1 was 2.92L, which is 84.5% of predicted; normal flow volume loop. PFTs were performed to evaluate for SOB, asthma  FENO was 18; a normal value being less than 25 Fractional exhaled nitric oxide (FENO) is regarded as a simple, noninvasive method for assessing eosinophilic airway inflammation. Produced by a variety of cells within the lung, nitric oxide (NO) concentrations are generally low in healthy individuals. However, high concentrations of NO appear to be involved in nonspecific host defense mechanisms and chronic inflammatory diseases such as asthma. The American Thor[employer] Society (ATS) therefore has recommended using FENO to aid in the diagnosis and monitoring of eosinophilic airway inflammation and asthma, and for identifying steroid responsive individuals whose chronic respiratory symptoms may be caused by airway inflammation.

## 2024-05-22 RX ORDER — ALBUTEROL SULFATE 90 UG/1
108 (90 BASE) INHALANT RESPIRATORY (INHALATION)
Qty: 1 | Refills: 2 | Status: ACTIVE | COMMUNITY
Start: 2023-01-09 | End: 1900-01-01

## 2024-06-12 ENCOUNTER — APPOINTMENT (OUTPATIENT)
Dept: SLEEP CENTER | Facility: CLINIC | Age: 59
End: 2024-06-12
Payer: COMMERCIAL

## 2024-06-12 ENCOUNTER — OUTPATIENT (OUTPATIENT)
Dept: OUTPATIENT SERVICES | Facility: HOSPITAL | Age: 59
LOS: 1 days | End: 2024-06-12
Payer: COMMERCIAL

## 2024-06-12 PROCEDURE — 95811 POLYSOM 6/>YRS CPAP 4/> PARM: CPT

## 2024-06-12 PROCEDURE — 95811 POLYSOM 6/>YRS CPAP 4/> PARM: CPT | Mod: 26

## 2024-06-13 DIAGNOSIS — G47.33 OBSTRUCTIVE SLEEP APNEA (ADULT) (PEDIATRIC): ICD-10-CM

## 2024-07-22 ENCOUNTER — APPOINTMENT (OUTPATIENT)
Dept: PULMONOLOGY | Facility: CLINIC | Age: 59
End: 2024-07-22
Payer: COMMERCIAL

## 2024-07-22 PROCEDURE — 99441: CPT

## 2024-11-21 ENCOUNTER — APPOINTMENT (OUTPATIENT)
Dept: PULMONOLOGY | Facility: CLINIC | Age: 59
End: 2024-11-21
Payer: COMMERCIAL

## 2024-11-21 VITALS
BODY MASS INDEX: 42.74 KG/M2 | OXYGEN SATURATION: 96 % | TEMPERATURE: 97.2 F | RESPIRATION RATE: 18 BRPM | HEART RATE: 74 BPM | HEIGHT: 68 IN | DIASTOLIC BLOOD PRESSURE: 80 MMHG | WEIGHT: 282 LBS | SYSTOLIC BLOOD PRESSURE: 115 MMHG

## 2024-11-21 DIAGNOSIS — J45.30 MILD PERSISTENT ASTHMA, UNCOMPLICATED: ICD-10-CM

## 2024-11-21 DIAGNOSIS — J30.2 OTHER SEASONAL ALLERGIC RHINITIS: ICD-10-CM

## 2024-11-21 DIAGNOSIS — R06.02 SHORTNESS OF BREATH: ICD-10-CM

## 2024-11-21 DIAGNOSIS — J30.9 ALLERGIC RHINITIS, UNSPECIFIED: ICD-10-CM

## 2024-11-21 DIAGNOSIS — K21.9 GASTRO-ESOPHAGEAL REFLUX DISEASE W/OUT ESOPHAGITIS: ICD-10-CM

## 2024-11-21 DIAGNOSIS — G47.33 OBSTRUCTIVE SLEEP APNEA (ADULT) (PEDIATRIC): ICD-10-CM

## 2024-11-21 PROCEDURE — 99214 OFFICE O/P EST MOD 30 MIN: CPT | Mod: 25

## 2024-11-21 PROCEDURE — 95012 NITRIC OXIDE EXP GAS DETER: CPT

## 2024-11-21 PROCEDURE — 94010 BREATHING CAPACITY TEST: CPT

## 2025-05-19 ENCOUNTER — APPOINTMENT (OUTPATIENT)
Dept: PULMONOLOGY | Facility: CLINIC | Age: 60
End: 2025-05-19
Payer: COMMERCIAL

## 2025-05-19 VITALS
HEIGHT: 68 IN | DIASTOLIC BLOOD PRESSURE: 70 MMHG | SYSTOLIC BLOOD PRESSURE: 120 MMHG | OXYGEN SATURATION: 98 % | RESPIRATION RATE: 18 BRPM | BODY MASS INDEX: 40.92 KG/M2 | HEART RATE: 71 BPM | TEMPERATURE: 97.2 F | WEIGHT: 270 LBS

## 2025-05-19 DIAGNOSIS — G47.33 OBSTRUCTIVE SLEEP APNEA (ADULT) (PEDIATRIC): ICD-10-CM

## 2025-05-19 DIAGNOSIS — J30.9 ALLERGIC RHINITIS, UNSPECIFIED: ICD-10-CM

## 2025-05-19 DIAGNOSIS — J45.30 MILD PERSISTENT ASTHMA, UNCOMPLICATED: ICD-10-CM

## 2025-05-19 DIAGNOSIS — K21.9 GASTRO-ESOPHAGEAL REFLUX DISEASE W/OUT ESOPHAGITIS: ICD-10-CM

## 2025-05-19 DIAGNOSIS — R06.02 SHORTNESS OF BREATH: ICD-10-CM

## 2025-05-19 DIAGNOSIS — J30.2 OTHER SEASONAL ALLERGIC RHINITIS: ICD-10-CM

## 2025-05-19 PROCEDURE — 94010 BREATHING CAPACITY TEST: CPT

## 2025-05-19 PROCEDURE — 94727 GAS DIL/WSHOT DETER LNG VOL: CPT

## 2025-05-19 PROCEDURE — 99214 OFFICE O/P EST MOD 30 MIN: CPT | Mod: 25

## 2025-05-19 PROCEDURE — 95012 NITRIC OXIDE EXP GAS DETER: CPT

## 2025-05-19 PROCEDURE — 94729 DIFFUSING CAPACITY: CPT
